# Patient Record
(demographics unavailable — no encounter records)

---

## 2024-11-11 NOTE — ADDENDUM
[FreeTextEntry1] : This note was written by Keon Bautista on 11/11/2024 acting as scribe for Dr. Tyson South M.D.  I, Tyson South MD, have read and attest that all the information, medical decision making and discharge instructions within are true and accurate.

## 2024-11-11 NOTE — DISCUSSION/SUMMARY
[de-identified] : Right lumbar radiculopathy. Neuropathy. Discussed all options. Lumbar MRI referral. F/U after MRI All options discussed including rest, medicine, home exercise, acupuncture, Chiropractic care, Physical Therapy, Pain management, and last resort surgery. All questions were answered, all alternatives discussed, and the patient is in complete agreement with the treatment plan which the patient contributed to and discussed with me through the shared decision-making process. Follow-up appointment as instructed. Any issues and the patient will call or come in sooner.

## 2024-11-11 NOTE — PHYSICAL EXAM
[Normal] : Gait: normal [Johnson's Sign] : negative Johnson's sign [Pronator Drift] : negative pronator drift [SLR] : negative straight leg raise [de-identified] : 5 out of 5 motor strength, sensation is intact and symmetrical full range of motion flexion extension and rotation, no palpatory tenderness full range of motion of hips knees shoulders and elbows (all four extremities), no atrophy, negative straight leg raise, no pathological reflexes, no swelling, normal ambulation, no apparent distress skin is intact, no palpable lymph nodes, no upper or lower extremity instability, alert and oriented x3 and normal mood. Normal finger-to nose test.  No upper motor neuron findings. [de-identified] : I reviewed, interpreted and clinically correlated the following outside imaging studies,   MRI lumbar 5/1/24 ([facility]) Mild disc degenerative disease Mild stenosis L2-4 Multilevel minimal/mild disc bulges

## 2024-11-11 NOTE — HISTORY OF PRESENT ILLNESS
[Stable] : stable [de-identified] : 81 year old male presents for initial evaluation of lower back pain, chronic in nature (years). Pain is progressively worsening. Denies injury, trauma, or falls. Describes a constant, throbbing, aching pain in his right-sided lower back. Denies radiating pain or distal neuropathy symptoms (i.e. numbness, tingling, or weakness) down his LEs. Pain is aggravated with prolonged sitting and standing as well as with lying down. He takes Tylenol-Codeine 5-325mg via his PCP which helps the pain. He has previously tried NSAIDs, PT, and ESIs (Dr. Sibley) which have not helped. MRI of lumbar spine performed in March 2023. No fever, chills, sweats, nausea/vomiting. No bowel or bladder dysfunction, no recent weight loss or gain. No night pain. This history is in addition to the intake form that I personally reviewed.

## 2024-11-18 NOTE — PHYSICAL EXAM
[Normal] : Gait: normal [Johnson's Sign] : negative Johnson's sign [Pronator Drift] : negative pronator drift [SLR] : negative straight leg raise [de-identified] : 5 out of 5 motor strength, sensation is intact and symmetrical full range of motion flexion extension and rotation, no palpatory tenderness full range of motion of hips knees shoulders and elbows (all four extremities), no atrophy, negative straight leg raise, no pathological reflexes, no swelling, normal ambulation, no apparent distress skin is intact, no palpable lymph nodes, no upper or lower extremity instability, alert and oriented x3 and normal mood. Normal finger-to nose test.  No upper motor neuron findings. [de-identified] : I reviewed, interpreted and clinically correlated the following outside imaging studies,  MR SPINE LUMBAR  - ORDERED BY:  [facility]   PROCEDURE DATE:  11/12/2024    INTERPRETATION:  CLINICAL INFORMATION: Low back pain. Evaluate for spinal stenosis.  ADDITIONAL CLINICAL INFORMATION: Not Applicable  TECHNIQUE: Multiplanar, multisequence MRI was performed of the lumbar spine. IV Contrast: NONE  PRIOR STUDIES: Lumbar CT from 3/10/2019  FINDINGS:  LOCALIZER: No additional findings. BONES: Mild multilevel endplate cystic change and edema. Associated Schmorl's node formation. Fatty change within the sacrum from prior radiation therapy ALIGNMENT: Mild retrolisthesis at L3-L4 and L4-L5. SACROILIAC JOINTS/SACRUM: There is no sacral fracture. The SI joints are partially visualized but are intact. CONUS AND CAUDA EQUINA: The distal cord and conus are normal in signal. Conus terminates at L1. VISUALIZED INTRAPELVIC/INTRA-ABDOMINAL SOFT TISSUES: Infrarenal aortic aneurysm measuring up to 3.9 cm.. PARASPINAL SOFT TISSUES: Normal.   INDIVIDUAL LEVELS:  LOWER THORACIC SPINE: No spinal canal or neuroforaminal stenosis.  L1-L2: Mild disc bulging disc degeneration L2-L3: Moderate disc height loss with moderate disc bulging and endplate osteophyte formation. Minimal foraminal narrowing. Minimal spinal canal stenosis. L3-L4: Mild disc bulging with mild facet arthrosis. Small endplate osteophyte formation. Mild foraminal narrowing. L4-L5: Mild disc bulging and small endplate osteophyte formation. Mild facet arthrosis. No spinal canal or foraminal narrowing. L5-S1: Severe right and mild left facet arthrosis.   IMPRESSION:  Mild to moderate multilevel lumbar spondylosis with multilevel degenerative disc disease and facet arthrosis as detailed above. Minimal spinal canal stenosis at L2-L3.  No findings of osseous metastatic disease.  --- End of Report ---      MRI lumbar 5/1/24 ([facility]) Mild disc degenerative disease Mild stenosis L2-4 Multilevel minimal/mild disc bulges

## 2024-11-18 NOTE — HISTORY OF PRESENT ILLNESS
[Stable] : stable [de-identified] : 81 year old male presents for initial evaluation of lower back pain, chronic in nature (years). Pain is progressively worsening. Denies injury, trauma, or falls. Describes a constant, throbbing, aching pain in his right-sided lower back. Denies radiating pain or distal neuropathy symptoms (i.e. numbness, tingling, or weakness) down his LEs. Pain is aggravated with prolonged sitting and standing as well as with lying down. He takes Tylenol-Codeine 5-325mg via his PCP which helps the pain. He has previously tried NSAIDs, PT, and ESIs (Dr. Sibley) which have not helped. MRI of lumbar spine performed in March 2023. Presents today for MRI Lumbar review.  No fever, chills, sweats, nausea/vomiting. No bowel or bladder dysfunction, no recent weight loss or gain. No night pain. This history is in addition to the intake form that I personally reviewed.

## 2024-11-18 NOTE — DISCUSSION/SUMMARY
[de-identified] : Mild L2-3 stenosis. Neuropathy. Discussed all options. MDPx2. Referred to Dr. Jacob Sibley for pain management. No surgery for now.  All options discussed including rest, medicine, home exercise, acupuncture, Chiropractic care, Physical Therapy, Pain management, and last resort surgery. All questions were answered, all alternatives discussed, and the patient is in complete agreement with the treatment plan which the patient contributed to and discussed with me through the shared decision-making process. Follow-up appointment as instructed. Any issues and the patient will call or come in sooner. Wife agrees with plan.

## 2024-11-18 NOTE — ADDENDUM
[FreeTextEntry1] : This note was written by Keon Bautista on 11/18/2024 acting as scribe for Dr. Tyson South M.D.  I, Tyson South MD, have read and attest that all the information, medical decision making and discharge instructions within are true and accurate.

## 2025-01-24 NOTE — DISCUSSION/SUMMARY
[FreeTextEntry1] : 81 year old man with history of long-standing epilepsy well-controlled on PHT.  Also with history of atypical facial pain controlled at this time.  Of note, has numbness of both feet with no back pain with  sensation LT, vibration and PP. off Phenytoin . IS on Lyrica .  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  Better with Lyrica but had side effects. Did not do well with GBP.   Plan continue Lyrica 100/50/100 - mild swelling in b/l legs may be side effect; had full workup by his cardiologist which was negative for CHF, DVT. Off ZNS - morbid thoughts. reviewed seizure triggers F/U in May. Lives back in [place].  Total time 40min

## 2025-01-24 NOTE — PHYSICAL EXAM
[General Appearance - Alert] : alert [General Appearance - In No Acute Distress] : in no acute distress [Oriented To Time, Place, And Person] : oriented to person, place, and time [Impaired Insight] : insight and judgment were intact [Affect] : the affect was normal [Person] : oriented to person [Place] : oriented to place [Time] : oriented to time [Cranial Nerves Optic (II)] : visual acuity intact bilaterally,  visual fields full to confrontation, pupils equal round and reactive to light [Cranial Nerves Oculomotor (III)] : extraocular motion intact [Cranial Nerves Trigeminal (V)] : facial sensation intact symmetrically [Cranial Nerves Facial (VII)] : face symmetrical [Cranial Nerves Vestibulocochlear (VIII)] : hearing was intact bilaterally [Cranial Nerves Glossopharyngeal (IX)] : tongue and palate midline [Cranial Nerves Accessory (XI - Cranial And Spinal)] : head turning and shoulder shrug symmetric [Cranial Nerves Hypoglossal (XII)] : there was no tongue deviation with protrusion [Motor Strength] : muscle strength was normal in all four extremities [Involuntary Movements] : no involuntary movements were seen [No Muscle Atrophy] : normal bulk in all four extremities [Balance] : balance was intact [Limited Balance] : balance was intact [Past-pointing] : there was no past-pointing [Tremor] : no tremor present [Dysdiadochokinesia Bilaterally] : not present [Coordination - Dysmetria Impaired Finger-to-Nose Bilateral] : not present [Coordination - Dysmetria Impaired Heel-to-Shin Bilateral] : not present [2+] : Patella right 2+ [1+] : Ankle jerk left 1+ [FreeTextEntry6] : 5/5 b/l upper and lower limbs [FreeTextEntry7] : Sensory intact LT/PP/Vibration upper limbs and above knees.  Decreased vibration below knees and decreased light touch and pp. [Sclera] : the sclera and conjunctiva were normal [PERRL With Normal Accommodation] : pupils were equal in size, round, reactive to light, with normal accommodation [Extraocular Movements] : extraocular movements were intact [Full Pulse] : the pedal pulses are present [Edema] : there was no peripheral edema [Abnormal Walk] : normal gait [Nail Clubbing] : no clubbing  or cyanosis of the fingernails [Musculoskeletal - Swelling] : no joint swelling seen [Motor Tone] : muscle strength and tone were normal

## 2025-01-24 NOTE — HISTORY OF PRESENT ILLNESS
[FreeTextEntry1] : **UPDATE:1/24/2025**  Mr Bertin Mendieta is here today for a scheduled follow up office visit. No further episodes. Stopped ZNS 2/2 morbid thoughts. Doing well on Lyrica 100/50/100. Increased secondary to pain in legs Back in [place].  **UPDATE:8/19/2024**  Mr Bertin Mendieta is here today for a scheduled follow up office visit. No further episodes. Stopped ZNS 2/2 morbid thoughts. Doing well on Lyrica 100/50/100. Increased secondary to pain in legs Some mild swelling in legs - saw cardiologist with normal workup and no DVTs per PMD. Moving back to [place].  **UPDATE:4/16/2024**  Mr eBrtin Mendieta is here today for a scheduled follow up office visit. No further episodes. Stopped ZNS 2/2 morbid thoughts. Doing well on Lyrica 50/50/100. Some mild swelling in legs - saw cardiologist with normal workup. Moving back to [place].  **UPDATE:1/8/2024**  Mr Bertin Mendieta is here today for a scheduled follow up office visit. No further episodes. Stopped ZNS 2/2 morbid thoughts. Doing well on Lyrica 50/50/75.  **UPDATE:11/20/2023**  Mr Bertin Mendieta is here today for a scheduled follow up office visit. He reports ast episode (a few months  ago had a seizure bought on by stress)  s/p ablation spinal stenosis   He lowered his dose of Pregabalin to 175 mg daily .He reports that his legs and feet feel "heavy" on it   ******* Interval Hx 10/9/2023:  Now on Lyrica 50/50/100 and off PHT.  AEEG was normal.   No seizures.  Did not feel well on Vimpat and wants to stay on Lyrica (only Novadoz Brand).  Of note, had increased back pain and was given Tramadol with some confusion after - no longer taking.  Interval Hx 7/21//2023:  Now on Lyrica 50/50/75 and off PHT.  AEEG was normal.   No seizures.  Did not feel well on Vimpat and wants to stay on Lyrica (only Novadoz Brand). Swelling of leg down. NO DVTs, but arterial stenosis on US which was discussed with patient. Skin cancer (basal cell) on nose.  Interval Hx 4/1//2023:  Now on Lyrica 50/50/75 and off PHT.  AEEG was normal.   No seizures.  Did not feel well on Vimpat and wants to stay on Lyrica (only Novadoz Brand). Swelling of leg down. NO DVTs, but arterial stenosis on US which was discussed with patient.   Interval Hx 3/7/2023:  Now on Lyrica 100/100 and off PHT.  AEEG was normal.   No seizures.  Did not feel well on Lyrica and wants to change at this time. Also with left leg swelling at ankle.  Interval Hx 12/12/2022:  Now on Lyrica 100/50/100 and off PHT.  AEEG was normal.   No seizures.  Did not do well when lowering Lyrica.  Interval Hx 9/6/2022:  Now on Lyrica 29756/75 and off PHT.  AEEG was normal.   No seizures.  Did not do well when lowering Lyrica.  Interval Hx 8/1/2022:  Now on Lyrica 75/50/75 and off PHT.  AEEG was normal.   No seizures.  Did not do well when lowering Lyrica.  Interval Hx 5/20/2022:  Now on Lyrica 25 TID and off PHT.  AEEG was normal.   No seizures.  Did not do well when lowering Lyrica to 25 BID.  Interval Hx 4/8/2022:  Now on Lyrica 50 BID and off PHT.  AEEG was normal.   No seizures.  Some GI upset and weight gain with Lyrica and not feeling well overall. Would like to talk about changing medication.   Interval Hx 3/7/2022:  Now on Lyrica 50 TID and off PHT.  AEEG was normal.   No seizures.  Some GI upset and weight gain with Lyrica but improved. Taking generic and feeling well - off Neurontin.  Interval Hx 1/25/2022:  Now on Lyrica 50 TID and off PHT.  AEEG was normal.   No seizures.  Some GI upset and weight gain with Lyrica but improved on BRAND name. However, now states that not feeling well and more anxious which attributing to medication. Would like to change.  Interval Hx 12/20/2021:  Now on Lyrica 75bid and off PHT.  AEEG was normal.   No seizures.  Some GI upset and weight gain with Lyrica but now improved on BRAND name. Still with leg pain in morning.  Interval Hx 10/26/2021:  Now on Lyrica 50mg btd and off PHT.  AEEG was normal.   No seizures.  Some GI upset and weight gain with Lyrica but now improved while taking 50mg dosing.  Doing well at 50mg TID compared to 75mg bid.  BRAND name.  ***UPDATE:11/16/2021*** Mr Bertin Mendieta is here today for a scheduled follow up office visit   complaints : tires easily, anxious, bowel movements 3 times daily (not diarrheal) tinitus (followed at [facility])  interval Hx 8/13/2021:  Now on Lyrica 75mg bid and off PHT.  AEEG was normal.   No seizures.  Some GI upset and weight gain with Lyrica but now improved.  Interval Hx 8/13/2021:  Now on PHT 100mg daily.  AEEG was normal.  Self-lowered PHT because having burning pain in legs that has improved since lowering the medication.  Had lower ext dopplers done on both legs this week that were normal by PMD.  No seizures.  Interval Hx 6/14/2021:  Now on PHT 200mg daily.  AEEG was normal.  Still with panic at night.     Interval Hx 6/4/2021:  Since the last visit the patient was lowered on his PHT to 200mg/night by his PMD after a level came back in the low 20s.  He did not feel well and was increased to 260mg/night.  He still has not felt well in that he feels "off" and is unable to sleep with more anxiety/panic.  Took himself off PHT and felt worse and now on 200mg.  Still with panic at night.  Now on Klonopin 0.25 bid.  Interval Hx 4/26/2021:  Since the last visit the patient was lowered on his PHT to 200mg/night by his PMD after a level came back in the low 20s.  He did not feel well and was increased to 260mg/night.  He still has not felt well in that he feels "off" and is unable to sleep with more anxiety/panic.  Additionally, he had EMG/NCS done that showed mild sensory neuropathy.  Past HPI: Mr. Bertin Mendieta in a 77 year old man who was followed by Dr. Paulino Parr for many years with a diagnosis of epilepsy and resolved atypical facial pain.  The patient had seizures since the 1970s .  The seizures are explained as inability to speak without clear LOC.  They last a few minutes prior to return to baseline.  The patient states that has not had a seizure in several years and is well-controlled on PHT 300mg qhs.  Has been at higher doses in the past with equilibrium issues.  For his facial pain, was tried on GBP and CBZ in the past that did not help.  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    The patient also notes numbness in both feet for about the past year.  He has not had this worked up prior to today.  He states there is no pain but "they feel asleep".  This is constant and in both feet systemically.  He has not had any falls or weakness in the feet.  He believes this likely has progressed.    PMHx: as above, HTN well-controlled Meds: PHT 300mg qhs, Benicar NKDA No FH of epilespy SocHx as above

## 2025-05-20 NOTE — DISCUSSION/SUMMARY
[FreeTextEntry1] : 81 year old man with history of long-standing epilepsy well-controlled on PHT.  Also with history of atypical facial pain controlled at this time.  Of note, has numbness of both feet with no back pain with  sensation LT, vibration and PP. off Phenytoin . IS on Lyrica .  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  Better with Lyrica but had side effects. Did not do well with GBP.   Plan continue Lyrica 100/50/100 - mild swelling in b/l legs may be side effect; had full workup by his cardiologist which was negative for CHF, DVT. Off ZNS - morbid thoughts. reviewed seizure triggers F/U in Sept. Lives back in [place]. Also notes pain with spinal stenosis and referred to our Pain center.  Total time 40min

## 2025-05-20 NOTE — HISTORY OF PRESENT ILLNESS
[FreeTextEntry1] : **UPDATE:5/20/2025**  Mr Bertin Mendieta is here today for a scheduled follow up office visit. No further episodes. Stopped ZNS 2/2 morbid thoughts. Doing well on Lyrica 100/50/100. Increased secondary to pain in legs. We increased him to 150mg bid and did not feel well so back to 100/50/100. Back in [place].  **UPDATE:1/24/2025**  Mr Bertin Mendieta is here today for a scheduled follow up office visit. No further episodes. Stopped ZNS 2/2 morbid thoughts. Doing well on Lyrica 100/50/100. Increased secondary to pain in legs Back in [place].  **UPDATE:8/19/2024**  Mr Bertin Mendieta is here today for a scheduled follow up office visit. No further episodes. Stopped ZNS 2/2 morbid thoughts. Doing well on Lyrica 100/50/100. Increased secondary to pain in legs Some mild swelling in legs - saw cardiologist with normal workup and no DVTs per PMD. Moving back to [place].  **UPDATE:4/16/2024**  Mr Bertin Mendieta is here today for a scheduled follow up office visit. No further episodes. Stopped ZNS 2/2 morbid thoughts. Doing well on Lyrica 50/50/100. Some mild swelling in legs - saw cardiologist with normal workup. Moving back to [place].  **UPDATE:1/8/2024**  Mr Bertin Mendieta is here today for a scheduled follow up office visit. No further episodes. Stopped ZNS 2/2 morbid thoughts. Doing well on Lyrica 50/50/75.  **UPDATE:11/20/2023**  Mr Bertin Mendieta is here today for a scheduled follow up office visit. He reports ast episode (a few months  ago had a seizure bought on by stress)  s/p ablation spinal stenosis   He lowered his dose of Pregabalin to 175 mg daily .He reports that his legs and feet feel "heavy" on it   ******* Interval Hx 10/9/2023:  Now on Lyrica 50/50/100 and off PHT.  AEEG was normal.   No seizures.  Did not feel well on Vimpat and wants to stay on Lyrica (only Novadoz Brand).  Of note, had increased back pain and was given Tramadol with some confusion after - no longer taking.  Interval Hx 7/21//2023:  Now on Lyrica 50/50/75 and off PHT.  AEEG was normal.   No seizures.  Did not feel well on Vimpat and wants to stay on Lyrica (only Novadoz Brand). Swelling of leg down. NO DVTs, but arterial stenosis on US which was discussed with patient. Skin cancer (basal cell) on nose.  Interval Hx 4/1//2023:  Now on Lyrica 50/50/75 and off PHT.  AEEG was normal.   No seizures.  Did not feel well on Vimpat and wants to stay on Lyrica (only Novadoz Brand). Swelling of leg down. NO DVTs, but arterial stenosis on US which was discussed with patient.   Interval Hx 3/7/2023:  Now on Lyrica 100/100 and off PHT.  AEEG was normal.   No seizures.  Did not feel well on Lyrica and wants to change at this time. Also with left leg swelling at ankle.  Interval Hx 12/12/2022:  Now on Lyrica 100/50/100 and off PHT.  AEEG was normal.   No seizures.  Did not do well when lowering Lyrica.  Interval Hx 9/6/2022:  Now on Lyrica 68978/75 and off PHT.  AEEG was normal.   No seizures.  Did not do well when lowering Lyrica.  Interval Hx 8/1/2022:  Now on Lyrica 75/50/75 and off PHT.  AEEG was normal.   No seizures.  Did not do well when lowering Lyrica.  Interval Hx 5/20/2022:  Now on Lyrica 25 TID and off PHT.  AEEG was normal.   No seizures.  Did not do well when lowering Lyrica to 25 BID.  Interval Hx 4/8/2022:  Now on Lyrica 50 BID and off PHT.  AEEG was normal.   No seizures.  Some GI upset and weight gain with Lyrica and not feeling well overall. Would like to talk about changing medication.   Interval Hx 3/7/2022:  Now on Lyrica 50 TID and off PHT.  AEEG was normal.   No seizures.  Some GI upset and weight gain with Lyrica but improved. Taking generic and feeling well - off Neurontin.  Interval Hx 1/25/2022:  Now on Lyrica 50 TID and off PHT.  AEEG was normal.   No seizures.  Some GI upset and weight gain with Lyrica but improved on BRAND name. However, now states that not feeling well and more anxious which attributing to medication. Would like to change.  Interval Hx 12/20/2021:  Now on Lyrica 75bid and off PHT.  AEEG was normal.   No seizures.  Some GI upset and weight gain with Lyrica but now improved on BRAND name. Still with leg pain in morning.  Interval Hx 10/26/2021:  Now on Lyrica 50mg btd and off PHT.  AEEG was normal.   No seizures.  Some GI upset and weight gain with Lyrica but now improved while taking 50mg dosing.  Doing well at 50mg TID compared to 75mg bid.  BRAND name.  ***UPDATE:11/16/2021*** Mr Bertin Mendieta is here today for a scheduled follow up office visit   complaints : tires easily, anxious, bowel movements 3 times daily (not diarrheal) tinitus (followed at [facility])  interval Hx 8/13/2021:  Now on Lyrica 75mg bid and off PHT.  AEEG was normal.   No seizures.  Some GI upset and weight gain with Lyrica but now improved.  Interval Hx 8/13/2021:  Now on PHT 100mg daily.  AEEG was normal.  Self-lowered PHT because having burning pain in legs that has improved since lowering the medication.  Had lower ext dopplers done on both legs this week that were normal by PMD.  No seizures.  Interval Hx 6/14/2021:  Now on PHT 200mg daily.  AEEG was normal.  Still with panic at night.     Interval Hx 6/4/2021:  Since the last visit the patient was lowered on his PHT to 200mg/night by his PMD after a level came back in the low 20s.  He did not feel well and was increased to 260mg/night.  He still has not felt well in that he feels "off" and is unable to sleep with more anxiety/panic.  Took himself off PHT and felt worse and now on 200mg.  Still with panic at night.  Now on Klonopin 0.25 bid.  Interval Hx 4/26/2021:  Since the last visit the patient was lowered on his PHT to 200mg/night by his PMD after a level came back in the low 20s.  He did not feel well and was increased to 260mg/night.  He still has not felt well in that he feels "off" and is unable to sleep with more anxiety/panic.  Additionally, he had EMG/NCS done that showed mild sensory neuropathy.  Past HPI: Mr. Bertin Mendieta in a 77 year old man who was followed by Dr. Paulino Parr for many years with a diagnosis of epilepsy and resolved atypical facial pain.  The patient had seizures since the 1970s .  The seizures are explained as inability to speak without clear LOC.  They last a few minutes prior to return to baseline.  The patient states that has not had a seizure in several years and is well-controlled on PHT 300mg qhs.  Has been at higher doses in the past with equilibrium issues.  For his facial pain, was tried on GBP and CBZ in the past that did not help.  Resolved shortly after leaving work (worked in [employer] for [employer] in [place]).    The patient also notes numbness in both feet for about the past year.  He has not had this worked up prior to today.  He states there is no pain but "they feel asleep".  This is constant and in both feet systemically.  He has not had any falls or weakness in the feet.  He believes this likely has progressed.    PMHx: as above, HTN well-controlled Meds: PHT 300mg qhs, Benicar NKDA No FH of epilespy SocHx as above